# Patient Record
Sex: FEMALE | Race: WHITE
[De-identification: names, ages, dates, MRNs, and addresses within clinical notes are randomized per-mention and may not be internally consistent; named-entity substitution may affect disease eponyms.]

---

## 2018-03-15 ENCOUNTER — HOSPITAL ENCOUNTER (EMERGENCY)
Dept: HOSPITAL 59 - ER | Age: 58
Discharge: HOME | End: 2018-03-15
Payer: COMMERCIAL

## 2018-03-15 DIAGNOSIS — R94.5: ICD-10-CM

## 2018-03-15 DIAGNOSIS — R10.31: ICD-10-CM

## 2018-03-15 DIAGNOSIS — R11.0: ICD-10-CM

## 2018-03-15 DIAGNOSIS — K56.7: Primary | ICD-10-CM

## 2018-03-15 DIAGNOSIS — F17.210: ICD-10-CM

## 2018-03-15 LAB
ALBUMIN SERPL-MCNC: 4.4 G/DL (ref 4–5)
ALBUMIN/GLOB SERPL: 1.2 {RATIO} (ref 1.1–1.8)
ALP SERPL-CCNC: 111 U/L (ref 35–104)
ALT SERPL-CCNC: 66 U/L (ref ?–33)
ANION GAP SERPL CALC-SCNC: 18 MMOL/L (ref 7–16)
APPEARANCE UR: CLEAR
AST SERPL-CCNC: 45 U/L (ref 10–35)
BACTERIA #/AREA URNS HPF: (no result) /[HPF]
BASOPHILS NFR BLD: 0 % (ref 0–6)
BILIRUB SERPL-MCNC: 0.5 MG/DL (ref 0.2–1)
BILIRUB UR-MCNC: NEGATIVE MG/DL
BUN SERPL-MCNC: 12 MG/DL (ref 6–20)
CO2 SERPL-SCNC: 19 MMOL/L (ref 22–29)
COLOR UR: YELLOW
CREAT SERPL-MCNC: 0.6 MG/DL (ref 0.5–0.9)
EOSINOPHIL NFR BLD: 2 % (ref 0–6)
EPI CELLS #/AREA URNS HPF: (no result) /[HPF]
ERYTHROCYTE [DISTWIDTH] IN BLOOD BY AUTOMATED COUNT: 14.2 % (ref 11.5–14.5)
EST GLOMERULAR FILTRATION RATE: > 60 ML/MIN
GLOBULIN SER-MCNC: 3.6 GM/DL (ref 1.4–4.8)
GLUCOSE SERPL-MCNC: 158 MG/DL (ref 74–109)
HCT VFR BLD CALC: 44.8 % (ref 35–47)
HGB BLD-MCNC: 15 GM/DL (ref 11.6–16)
KETONES UR QL STRIP: (no result)
LIPASE SERPL-CCNC: 18 U/L (ref 13–60)
LYMPHOCYTES NFR BLD: 10 % (ref 16–45)
MCH RBC QN AUTO: 29.6 PG (ref 27–33)
MCHC RBC AUTO-ENTMCNC: 33.5 G/DL (ref 32–36)
MCV RBC AUTO: 88.5 FL (ref 81–97)
MONOCYTES NFR BLD: 6 % (ref 0–9)
NEUTROPHILS NFR BLD AUTO: 82 % (ref 47–80)
NEUTS BAND NFR BLD: 0 % (ref 0–5)
NITRITE UR QL STRIP: NEGATIVE
PLATELET # BLD: 348 K/UL (ref 130–400)
PMV BLD AUTO: 9.1 FL (ref 7.4–10.4)
PROT SERPL-MCNC: 8 G/DL (ref 6.6–8.7)
PROT UR QL STRIP: (no result)
RBC # BLD AUTO: 5.06 M/UL (ref 3.8–5.4)
RBC # UR STRIP: (no result) /UL
URINE LEUKOCYTE ESTERASE: NEGATIVE
UROBILINOGEN UR STRIP-ACNC: 0.2 E.U./DL (ref 0.2–1)
WBC # BLD AUTO: 11.9 K/UL (ref 4.2–12.2)
WBC #/AREA URNS HPF: (no result) /[HPF]

## 2018-03-15 PROCEDURE — 96375 TX/PRO/DX INJ NEW DRUG ADDON: CPT

## 2018-03-15 PROCEDURE — 99284 EMERGENCY DEPT VISIT MOD MDM: CPT

## 2018-03-15 PROCEDURE — 96374 THER/PROPH/DIAG INJ IV PUSH: CPT

## 2018-03-15 PROCEDURE — 74176 CT ABD & PELVIS W/O CONTRAST: CPT

## 2018-03-15 PROCEDURE — 83690 ASSAY OF LIPASE: CPT

## 2018-03-15 PROCEDURE — 80053 COMPREHEN METABOLIC PANEL: CPT

## 2018-03-15 PROCEDURE — 81001 URINALYSIS AUTO W/SCOPE: CPT

## 2018-03-15 PROCEDURE — 85027 COMPLETE CBC AUTOMATED: CPT

## 2018-03-15 PROCEDURE — 96376 TX/PRO/DX INJ SAME DRUG ADON: CPT

## 2018-03-15 RX ADMIN — ONDANSETRON ONE MG: 2 INJECTION INTRAMUSCULAR; INTRAVENOUS at 00:44

## 2018-03-15 RX ADMIN — HYDROMORPHONE HYDROCHLORIDE ONE MG: 2 INJECTION, SOLUTION INTRAMUSCULAR; INTRAVENOUS; SUBCUTANEOUS at 02:24

## 2018-03-15 RX ADMIN — ONDANSETRON ONE MG: 2 INJECTION INTRAMUSCULAR; INTRAVENOUS at 02:23

## 2018-03-15 RX ADMIN — PHENOBARBITAL ELIXIR ONE LIQUID: 16.2; .1037; .0065; .0194 ELIXIR ORAL at 01:11

## 2018-03-15 NOTE — EMERGENCY DEPARTMENT RECORD
History of Present Illness





- General


Chief Complaint: Abdominal Pain


Stated Complaint: SEVERE ABD PAIN


Time Seen by Provider: 03/15/18 00:46


Source: Patient


Mode of Arrival: Ambulatory


Limitations: No limitations





- History of Present Illness


Initial Comments: 





pt had epigastric pain this evening.  she ate and it made it worse. she states 

it is a burning pain .  she has nausea but no v/c/d.


MD Complaint: Abdominal pain


Onset/Timin


-: Hour(s)


Location: Epigastric


Radiation: None


Migration to: Periumbilical


Quality: Burning


Consistency: Constant


Improves With: Nothing


Worsens With: Eating, Movement


Associated Symptoms: Nausea





- Related Data


Patient Pregnant: No


 Home Medications











 Medication  Instructions  Recorded  Confirmed  Last Taken


 


Aripiprazole [Abilify] 0.5 tab PO DAILY 03/15/18 03/15/18 Unknown


 


Duloxetine HCl [Cymbalta] 20 mg PO DAILY 03/15/18 03/15/18 Unknown


 


New Med-Thyroid ? Name & Dose 1 tab PO DAILY 03/15/18 03/15/18 Unknown








 Previous Rx's











 Medication  Instructions  Recorded


 


Omeprazole [Prilosec] 20 mg PO DAILY #14 cap.dr 03/15/18











 Allergies











Allergy/AdvReac Type Severity Reaction Status Date / Time


 


Penicillins AdvReac  NAUSEA AND Verified 03/15/18 00:30





   VOMITING  














Travel Screening





- Travel/Exposure Within Last 30 Days


Have you traveled within the last 30 days?: No





- Travel Symptoms


Symptom Screening: None





Review of Systems


Reviewed: No additional complaints except as noted below


Constitutional: Reports: As per HPI.  Denies: Chills, Fever, Malaise, Night 

sweats, Weakness, Weight change


Eyes: Reports: As per HPI.  Denies: Eye discharge, Eye pain, Photophobia, 

Vision change


ENT: Reports: As per HPI.  Denies: Congestion, Dental pain, Ear pain, Epistaxis

, Hearing loss, Throat pain


Respiratory: Reports: As per HPI.  Denies: Cough, Dyspnea, Hemoptysis, Stridor, 

Wheezes


Cardiovascular: Reports: As per HPI.  Denies: Arrhythmia, Chest pain, Dyspnea 

on exertion, Edema, Murmurs, Orthopnea, Palpitations, Paroxysmal nocturnal 

dyspnea, Rheumatic Fever, Syncope


Endocrine: Reports: As per HPI.  Denies: Fatigue, Heat or cold intolerance, 

Polydipsia, Polyuria


Gastrointestinal: Reports: As per HPI, Abdominal pain, Nausea.  Denies: 

Constipation, Diarrhea, Hematemesis, Hematochezia, Melena, Vomiting


Genitourinary: Reports: As per HPI.  Denies: Abnormal menses, Discharge, 

Dyspareunia, Dysuria, Frequency, Hematuria, Incontinence, Retention, Urgency


Musculoskeletal: Reports: As per HPI.  Denies: Arthralgia, Back pain, Gout, 

Joint swelling, Myalgia, Neck pain


Skin: Reports: As per HPI.  Denies: Bruising, Change in color, Change in hair/

nails, Lesions, Pruritus, Rash


Neurological: Reports: As per HPI.  Denies: Abnormal gait, Confusion, Headache, 

Numbness, Paresthesias, Seizure, Tingling, Tremors, Vertigo, Weakness


Psychiatric: Reports: As per HPI.  Denies: Anxiety, Auditory hallucinations, 

Depression, Homicidal thoughts, Suicidal thoughts, Visual hallucinations


Hematological/Lymphatic: Reports: As per HPI.  Denies: Anemia, Blood Clots, 

Easy bleeding, Easy bruising, Swollen glands





Past Medical History





- SOCIAL HISTORY


Smoking Status: Current every day smoker





- RESPIRATORY


Hx Respiratory Disorders: No





- CARDIOVASCULAR


Hx Cardio Disorders: No





- NEURO


Hx Neuro Disorders: No





- GI


Hx GI Disorders: No





- 


Hx Genitourinary Disorders: No





- ENDOCRINE


Hx Endocrine Disorders: Yes


Hx Thyroid Disease: Yes





- MUSCULOSKELETAL


Hx Musculoskeletal Disorders: No





- PSYCH


Hx Psych Problems: Yes


Hx Anxiety: Yes ("nerves")





- HEMATOLOGY/ONCOLOGY


Hx Hematology/Oncology Disorders: No





Family Medical History


Any Significant Family History?: No


Family Hx Comment (NOT TO BE USED IN PLACE OF ITEMS BELOW): adopted





Physical Exam





- General


General Appearance: Alert, Oriented x3, Cooperative, Mild distress





- Head


Head exam: Normal inspection





- Eye


Eye exam: Normal appearance, PERRL, EOMI


Pupils: Normal accommodation





- ENT


ENT exam: Normal exam, Mucous membranes moist, Normal external ear exam, Normal 

orophraynx


Ear exam: Normal external inspection.  negative: External canal tenderness


Nasal Exam: Normal inspection.  negative: Discharge, Sinus tenderness


Mouth exam: Normal external inspection, Tongue normal


Teeth exam: Normal inspection.  negative: Dental caries


Throat exam: Normal inspection.  negative: Tonsillar erythema, Tonsillar exudate





- Neck


Neck exam: Normal inspection, Full ROM.  negative: Tenderness





- Respiratory


Respiratory exam: Normal lung sounds bilaterally.  negative: Respiratory 

distress





- Cardiovascular


Cardiovascular Exam: Regular rate, Normal rhythm, Normal heart sounds





- GI/Abdominal


GI/Abdominal exam: Soft, Normal bowel sounds, Tenderness





- Rectal


Rectal exam: Deferred





- 


 exam: Deferred





- Extremities


Extremities exam: Normal inspection, Full ROM, Normal capillary refill.  

negative: Tenderness





- Back


Back exam: Reports: Normal inspection, Full ROM.  Denies: Muscle spasm, Rash 

noted, Tenderness





- Neurological


Neurological exam: Alert, CN II-XII intact, Normal gait, Oriented X3





- Psychiatric


Psychiatric exam: Normal affect, Normal mood





- Skin


Skin exam: Dry, Intact, Normal color, Warm





Course





 Vital Signs











  03/15/18 03/15/18





  00:33 01:12


 


Temperature 97.7 F 


 


Pulse Rate 66 


 


Pulse Rate [  78





Pulse Ox Probe]  


 


Respiratory 18 18





Rate  


 


Blood Pressure 156/108 


 


Blood Pressure  148/92





[Right Arm]  


 


Pulse Ox 99 99














- Reevaluation(s)


Reevaluation #1: 





03/15/18 03:02


pt feels better


Reevaluation #2: 





03/15/18 03:06


pt feels better





Medical Decision Making





- Lab Data


Result diagrams: 


 03/15/18 00:35





 03/15/18 00:35





 Lab Results











  03/15/18 03/15/18 Range/Units





  00:35 00:35 


 


WBC  11.9   (4.2-12.2)  K/uL


 


RBC  5.06   (3.80-5.40)  M/uL


 


Hgb  15.0   (11.6-16.0)  gm/dl


 


Hct  44.8   (35.0-47.0)  %


 


MCV  88.5   (81-97)  fl


 


MCH  29.6   (27-33)  pg


 


MCHC  33.5   (32-36)  g/dl


 


RDW  14.2   (11.5-14.5)  %


 


Plt Count  348   (130-400)  K/uL


 


MPV  9.1   (7.4-10.4)  fl


 


Neutrophils %  82.0 H   (47-80)  %


 


Band Neutrophils %  0.0   (0-5)  %


 


Eosinophils %  Not Reportable   


 


Basophils %  Not Reportable   


 


Lymphocytes  10.0 L   (16-45)  %


 


Monocytes  6.0   (0-9)  %


 


Basophils  0.0   (0-6)  %


 


Eosinophil Count  2.0   (0-6)  %


 


Sodium   136  (136-145)  mmol/L


 


Potassium   4.1  (3.4-4.5)  mmol/L


 


Chloride   99  ()  mmol/L


 


Carbon Dioxide   19.0 L  (22-29)  mmol/L


 


Anion Gap   18.0 H  (7-16)  


 


BUN   12  (6-20)  mg/dL


 


Creatinine   0.6  (0.5-0.9)  mg/dL


 


Estimated GFR   > 60  mL/min


 


Random Glucose   158 H  ()  mg/dL


 


Calcium   9.3  (8.6-10.0)  mg/dL


 


Total Bilirubin   0.50  (0.2-1.0)  mg/dL


 


AST   45 H  (10.0-35.0)  U/L


 


ALT   66 H  (<33)  U/L


 


Alkaline Phosphatase   111 H  ()  U/L


 


Total Protein   8.0  (6.6-8.7)  g/dL


 


Albumin   4.4  (4.0-5.0)  g/dL


 


Globulin   3.6  (1.4-4.8)  gm/dL


 


Albumin/Globulin Ratio   1.2  (1.1-1.8)  


 


Lipase   18  (13-60)  U/L














Disposition


Disposition: Discharge


Clinical Impression: 


 Ileus, Elevated liver enzymes





Abdominal pain


Qualifiers:


 Abdominal location: epigastric Qualified Code(s): R10.13 - Epigastric pain





Disposition: Home, Self-Care


Condition: (1) Good


Instructions:  Abdominal Pain (ED)


Additional Instructions: 


follow up with family doctor tomorrow.  return sooner if worse


Prescriptions: 


Omeprazole [Prilosec] 20 mg PO DAILY #14 cap


Forms:  Patient Portal Access





Quality





- Quality Measures


Quality Measures: N/A





- Blood Pressure Screening


Does Patient Have Any of the Following: No


Blood Pressure Classification: Hypertensive Reading


Systolic Measurement: 156


Diastolic Measurement: 108


Screening for High Blood Pressure: < First Hypertensive BP, F/U Documented > [

]


First Hypertensive Follow-up Interventions: Follow-up with rescreen GT 1 day 

and LT 4 weeks.

## 2018-03-16 NOTE — CT SCAN REPORT
EXAM:  EMERGENCY CT OF THE ABDOMEN AND PELVIS WITHOUT CONTRAST 



HISTORY:  UPPER ABDOMINAL DULL ACHING PAIN FOR ONE DAY.  HYSTERECTOMY.  



TECHNIQUE:  Axial CT scan of the abdomen and pelvis was performed without oral 
or IV contrast.  A preliminary report was provided by [facility].  



Comparison:  None.  



FINDINGS:  No calcified gallstones are seen within the gallbladder.  No 
intrarenal calculi identified on either side.  No hydronephrosis or hydroureter 
seen on either side.  No ureteral calculus seen on either side and no bladder 
calculus identified.  



Evaluation of the bowel and viscera is somewhat limited without oral or IV 
contrast.  Given this limitation, no definite hepatic, splenic, adrenal, 
pancreatic, or renal mass identified.  



The uterus is not identified consistent with the surgical history.  



The appendix is visualized and appears of normal caliber with no appendicitis 
identified.  Minor diverticulosis in the colon with no diverticulitis evident.  



Small periumbilical anterior abdominal wall hernia containing adipose tissue, 
but no bowel.  



There is a small amount of free fluid in the pelvis, but no free 
intraperitoneal air identified.  



There are some mildly dilated loops of what are probably distal jejunum in the 
pelvis.  The distal small bowel appears nondistended.  Early or incomplete 
small bowel obstruction cannot be excluded with this appearance.  There are 
some mild air fluid levels in the small bowel as well.    Follow-up abdomen 
plain film series may be useful to see if there is progressive small bowel 
dilatation to suggest developing small bowel obstruction.  



Multilevel degenerative disk disease in the lumbar spine and some facet joint 
arthropathy in the lower lumbar spine as well.  



IMPRESSION:  

1.  NO DEFINITE URINARY TRACT CALCULI OR HYDRONEPHROSIS EVIDENT.  



2.  SMALL PERIUMBILICAL ANTERIOR ABDOMINAL WALL HERNIA CONTAINING ADIPOSE TISSUE
, BUT NO BOWEL.  



3.  SOME MILD DISTAL JEJUNAL DISTENTION IN PARTICULAR.  AN EARLY OR INCOMPLETE 
SMALL BOWEL OBSTRUCTION CANNOT BE EXCLUDED.   FOLLOW-UP ABDOMEN SERIES MAY BE 
USEFUL.  



4.  POSTOP HYSTERECTOMY.  



5.  DEGENERATIVE CHANGES IN THE LUMBAR SPINE. 



6.  THE APPENDIX APPEARS NEGATIVE.  SMALL AMOUNT OF FREE FLUID WITH NO FREE AIR 
EVIDENT.   



JOB NUMBER:  [id number]
[facility]